# Patient Record
Sex: FEMALE | Race: WHITE | NOT HISPANIC OR LATINO
[De-identification: names, ages, dates, MRNs, and addresses within clinical notes are randomized per-mention and may not be internally consistent; named-entity substitution may affect disease eponyms.]

---

## 2017-11-03 ENCOUNTER — RESULT REVIEW (OUTPATIENT)
Age: 32
End: 2017-11-03

## 2018-11-14 ENCOUNTER — RECORD ABSTRACTING (OUTPATIENT)
Age: 33
End: 2018-11-14

## 2018-11-14 DIAGNOSIS — Z82.49 FAMILY HISTORY OF ISCHEMIC HEART DISEASE AND OTHER DISEASES OF THE CIRCULATORY SYSTEM: ICD-10-CM

## 2018-11-14 DIAGNOSIS — Z78.9 OTHER SPECIFIED HEALTH STATUS: ICD-10-CM

## 2018-11-14 PROBLEM — Z00.00 ENCOUNTER FOR PREVENTIVE HEALTH EXAMINATION: Status: ACTIVE | Noted: 2018-11-14

## 2018-11-14 LAB — CYTOLOGY CVX/VAG DOC THIN PREP: NORMAL

## 2018-11-28 ENCOUNTER — LABORATORY RESULT (OUTPATIENT)
Age: 33
End: 2018-11-28

## 2018-11-28 ENCOUNTER — APPOINTMENT (OUTPATIENT)
Dept: OBGYN | Facility: CLINIC | Age: 33
End: 2018-11-28
Payer: COMMERCIAL

## 2018-11-28 VITALS
HEIGHT: 63 IN | SYSTOLIC BLOOD PRESSURE: 108 MMHG | WEIGHT: 149 LBS | DIASTOLIC BLOOD PRESSURE: 70 MMHG | BODY MASS INDEX: 26.4 KG/M2

## 2018-11-28 PROCEDURE — G0101: CPT

## 2018-12-04 LAB — CYTOLOGY CVX/VAG DOC THIN PREP: NORMAL

## 2019-05-20 ENCOUNTER — RX CHANGE (OUTPATIENT)
Age: 34
End: 2019-05-20

## 2019-06-17 ENCOUNTER — RX CHANGE (OUTPATIENT)
Age: 34
End: 2019-06-17

## 2019-10-09 ENCOUNTER — MEDICATION RENEWAL (OUTPATIENT)
Age: 34
End: 2019-10-09

## 2020-01-03 ENCOUNTER — MEDICATION RENEWAL (OUTPATIENT)
Age: 35
End: 2020-01-03

## 2020-01-07 ENCOUNTER — RX RENEWAL (OUTPATIENT)
Age: 35
End: 2020-01-07

## 2020-02-26 ENCOUNTER — APPOINTMENT (OUTPATIENT)
Dept: OBGYN | Facility: CLINIC | Age: 35
End: 2020-02-26
Payer: COMMERCIAL

## 2020-02-26 VITALS
HEIGHT: 63 IN | BODY MASS INDEX: 29.06 KG/M2 | DIASTOLIC BLOOD PRESSURE: 70 MMHG | WEIGHT: 164 LBS | SYSTOLIC BLOOD PRESSURE: 118 MMHG

## 2020-02-26 PROCEDURE — 99395 PREV VISIT EST AGE 18-39: CPT

## 2020-10-29 LAB
CYTOLOGY CVX/VAG DOC THIN PREP: NORMAL
HPV HIGH+LOW RISK DNA PNL CVX: NOT DETECTED

## 2021-09-28 ENCOUNTER — APPOINTMENT (OUTPATIENT)
Dept: OBGYN | Facility: CLINIC | Age: 36
End: 2021-09-28

## 2021-10-26 ENCOUNTER — RX RENEWAL (OUTPATIENT)
Age: 36
End: 2021-10-26

## 2022-01-18 ENCOUNTER — RX RENEWAL (OUTPATIENT)
Age: 37
End: 2022-01-18

## 2022-04-05 ENCOUNTER — RX RENEWAL (OUTPATIENT)
Age: 37
End: 2022-04-05

## 2022-04-19 ENCOUNTER — APPOINTMENT (OUTPATIENT)
Dept: OBGYN | Facility: CLINIC | Age: 37
End: 2022-04-19
Payer: COMMERCIAL

## 2022-04-19 ENCOUNTER — NON-APPOINTMENT (OUTPATIENT)
Age: 37
End: 2022-04-19

## 2022-04-19 VITALS
WEIGHT: 185 LBS | HEIGHT: 63 IN | BODY MASS INDEX: 32.78 KG/M2 | DIASTOLIC BLOOD PRESSURE: 70 MMHG | SYSTOLIC BLOOD PRESSURE: 120 MMHG

## 2022-04-19 DIAGNOSIS — Z01.419 ENCOUNTER FOR GYNECOLOGICAL EXAMINATION (GENERAL) (ROUTINE) W/OUT ABNORMAL FINDINGS: ICD-10-CM

## 2022-04-19 PROCEDURE — 99395 PREV VISIT EST AGE 18-39: CPT

## 2022-04-26 NOTE — HISTORY OF PRESENT ILLNESS
[TextBox_4] : 38yo G0 here for annual exam. Reg periods on OCP and happy with method.  \par H/o knee surgery- doing well.\par Asking about BTL for contraception.\par        She got  in [facility]. He proposed during swing dancing lessons. Honeymoon in [place]/ [place] next year ([place] [employer] [place] tour). They don't want children.  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  They have a cat..

## 2022-04-26 NOTE — PLAN
[FreeTextEntry1] : Discussed tubal ligation as method of contraception and r/b/a .  Pt will consider and let me know.

## 2022-04-29 LAB
CYTOLOGY CVX/VAG DOC THIN PREP: ABNORMAL
HPV HIGH+LOW RISK DNA PNL CVX: NOT DETECTED

## 2022-06-14 ENCOUNTER — NON-APPOINTMENT (OUTPATIENT)
Age: 37
End: 2022-06-14

## 2022-06-19 ENCOUNTER — RESULT REVIEW (OUTPATIENT)
Age: 37
End: 2022-06-19

## 2022-06-22 ENCOUNTER — APPOINTMENT (OUTPATIENT)
Dept: OBGYN | Facility: HOSPITAL | Age: 37
End: 2022-06-22

## 2022-07-05 ENCOUNTER — RX RENEWAL (OUTPATIENT)
Age: 37
End: 2022-07-05

## 2022-07-08 ENCOUNTER — RX RENEWAL (OUTPATIENT)
Age: 37
End: 2022-07-08

## 2022-10-03 ENCOUNTER — RESULT REVIEW (OUTPATIENT)
Age: 37
End: 2022-10-03

## 2022-10-05 ENCOUNTER — RESULT REVIEW (OUTPATIENT)
Age: 37
End: 2022-10-05

## 2022-10-06 ENCOUNTER — APPOINTMENT (OUTPATIENT)
Dept: OBGYN | Facility: HOSPITAL | Age: 37
End: 2022-10-06

## 2022-10-06 DIAGNOSIS — Z48.89 ENCOUNTER FOR OTHER SPECIFIED SURGICAL AFTERCARE: ICD-10-CM

## 2022-10-06 PROCEDURE — 58661 LAPAROSCOPY REMOVE ADNEXA: CPT | Mod: 50

## 2022-10-10 ENCOUNTER — TRANSCRIPTION ENCOUNTER (OUTPATIENT)
Age: 37
End: 2022-10-10

## 2022-10-20 ENCOUNTER — APPOINTMENT (OUTPATIENT)
Dept: OBGYN | Facility: CLINIC | Age: 37
End: 2022-10-20

## 2022-10-20 PROCEDURE — 99024 POSTOP FOLLOW-UP VISIT: CPT

## 2022-10-20 RX ORDER — NORGESTIMATE AND ETHINYL ESTRADIOL 7DAYSX3 28
0.18/0.215/0.25 KIT ORAL
Qty: 84 | Refills: 2 | Status: DISCONTINUED | COMMUNITY
Start: 2022-07-08 | End: 2022-10-20

## 2022-10-20 RX ORDER — NORGESTIMATE AND ETHINYL ESTRADIOL 7DAYSX3 28
0.18/0.215/0.25 KIT ORAL
Qty: 3 | Refills: 3 | Status: DISCONTINUED | COMMUNITY
End: 2022-10-20

## 2022-10-20 RX ORDER — OXYCODONE AND ACETAMINOPHEN 5; 325 MG/1; MG/1
5-325 TABLET ORAL
Qty: 10 | Refills: 0 | Status: DISCONTINUED | COMMUNITY
Start: 2022-10-06 | End: 2022-10-20

## 2024-03-06 ENCOUNTER — NON-APPOINTMENT (OUTPATIENT)
Age: 39
End: 2024-03-06

## 2024-03-06 ENCOUNTER — APPOINTMENT (OUTPATIENT)
Dept: FAMILY MEDICINE | Facility: CLINIC | Age: 39
End: 2024-03-06
Payer: COMMERCIAL

## 2024-03-06 VITALS
HEIGHT: 63 IN | OXYGEN SATURATION: 94 % | BODY MASS INDEX: 34.55 KG/M2 | DIASTOLIC BLOOD PRESSURE: 80 MMHG | HEART RATE: 103 BPM | SYSTOLIC BLOOD PRESSURE: 110 MMHG | WEIGHT: 195 LBS

## 2024-03-06 DIAGNOSIS — F41.9 ANXIETY DISORDER, UNSPECIFIED: ICD-10-CM

## 2024-03-06 DIAGNOSIS — U09.9 POST COVID-19 CONDITION, UNSPECIFIED: ICD-10-CM

## 2024-03-06 DIAGNOSIS — R00.0 TACHYCARDIA, UNSPECIFIED: ICD-10-CM

## 2024-03-06 DIAGNOSIS — F32.A ANXIETY DISORDER, UNSPECIFIED: ICD-10-CM

## 2024-03-06 PROCEDURE — 99204 OFFICE O/P NEW MOD 45 MIN: CPT

## 2024-03-06 PROCEDURE — 93000 ELECTROCARDIOGRAM COMPLETE: CPT

## 2024-03-06 PROCEDURE — G2211 COMPLEX E/M VISIT ADD ON: CPT

## 2024-03-06 PROCEDURE — 36415 COLL VENOUS BLD VENIPUNCTURE: CPT

## 2024-03-08 LAB
ALBUMIN SERPL ELPH-MCNC: 4.6 G/DL
ALP BLD-CCNC: 79 U/L
ALT SERPL-CCNC: 19 U/L
ANION GAP SERPL CALC-SCNC: 13 MMOL/L
AST SERPL-CCNC: 17 U/L
BASOPHILS # BLD AUTO: 0.05 K/UL
BASOPHILS NFR BLD AUTO: 0.6 %
BILIRUB SERPL-MCNC: 0.2 MG/DL
BUN SERPL-MCNC: 12 MG/DL
CALCIUM SERPL-MCNC: 9.3 MG/DL
CHLORIDE SERPL-SCNC: 103 MMOL/L
CO2 SERPL-SCNC: 23 MMOL/L
CREAT SERPL-MCNC: 0.69 MG/DL
EGFR: 115 ML/MIN/1.73M2
EOSINOPHIL # BLD AUTO: 0.19 K/UL
EOSINOPHIL NFR BLD AUTO: 2.2 %
FERRITIN SERPL-MCNC: 66 NG/ML
GLUCOSE SERPL-MCNC: 102 MG/DL
HCT VFR BLD CALC: 40 %
HGB BLD-MCNC: 12.9 G/DL
IMM GRANULOCYTES NFR BLD AUTO: 0.2 %
IRON SATN MFR SERPL: 14 %
IRON SERPL-MCNC: 50 UG/DL
LYMPHOCYTES # BLD AUTO: 2.02 K/UL
LYMPHOCYTES NFR BLD AUTO: 23.8 %
MAN DIFF?: NORMAL
MCHC RBC-ENTMCNC: 28.7 PG
MCHC RBC-ENTMCNC: 32.3 GM/DL
MCV RBC AUTO: 88.9 FL
MONOCYTES # BLD AUTO: 0.51 K/UL
MONOCYTES NFR BLD AUTO: 6 %
NEUTROPHILS # BLD AUTO: 5.71 K/UL
NEUTROPHILS NFR BLD AUTO: 67.2 %
PLATELET # BLD AUTO: 299 K/UL
POTASSIUM SERPL-SCNC: 4.2 MMOL/L
PROT SERPL-MCNC: 7.1 G/DL
RBC # BLD: 4.5 M/UL
RBC # FLD: 13.2 %
SODIUM SERPL-SCNC: 139 MMOL/L
TIBC SERPL-MCNC: 356 UG/DL
TSH SERPL-ACNC: 2.18 UIU/ML
UIBC SERPL-MCNC: 305 UG/DL
WBC # FLD AUTO: 8.5 K/UL

## 2024-04-04 NOTE — ASSESSMENT
[FreeTextEntry1] : Fatigue and symptoms likely due to anxiety/depression in addition to fatigue from COVID.  Labs drawn in office to evaluate medical origin of fatigue.  Mental health clinic referral information provided. ECG normal sinus rhythm Patient education provided regarding long covid and recovery process.  Follow up 2-4 weeks for lab results and mental health discussion.  I have personally evaluated and examined the patient. The Attending was available to me as a supervising provider if needed. Scribe Attestation (For Scribes USE Only).../I have personally evaluated and examined the patient. The Attending was available to me as a supervising provider if needed.

## 2024-04-04 NOTE — PHYSICAL EXAM
[Normal] : normal rate, regular rhythm, normal S1 and S2 and no murmur heard [Alert and Oriented x3] : oriented to person, place, and time [de-identified] : crying intermittently during visit

## 2024-04-04 NOTE — HISTORY OF PRESENT ILLNESS
[FreeTextEntry8] : 38 year old female new patient here for acute concerns.  History of COVID x3.  Last infection COVID a month ago and still with fatigue, dizziness/room spinning sensation, nonproductive cough.  Fatigue causing her to nap regularly.  Rapid heartbeats related to stress.  COVID negative.  Denies any loss of consciousness, falls, vision changes, tinnitus, hearing changes, chest pain, palpitations, shortness of breath.    Mental health very down the past year. Decreased interest in food shopping, sleeping all day.  Death of close friend to opioid addiction Sister in [place] with alcohol abuse and recently arrested and patient very scared to lose her as well Work has been stressful. Not speaking with therapist.  Denies SI/HI.